# Patient Record
Sex: FEMALE | Race: WHITE | Employment: FULL TIME | URBAN - METROPOLITAN AREA
[De-identification: names, ages, dates, MRNs, and addresses within clinical notes are randomized per-mention and may not be internally consistent; named-entity substitution may affect disease eponyms.]

---

## 2017-12-19 ENCOUNTER — HOSPITAL ENCOUNTER (EMERGENCY)
Age: 62
Discharge: HOME | End: 2017-12-19
Payer: COMMERCIAL

## 2017-12-19 ENCOUNTER — APPOINTMENT (OUTPATIENT)
Dept: GENERAL RADIOLOGY | Age: 62
End: 2017-12-19
Payer: COMMERCIAL

## 2017-12-19 VITALS
OXYGEN SATURATION: 99 % | SYSTOLIC BLOOD PRESSURE: 136 MMHG | HEIGHT: 67 IN | TEMPERATURE: 97.8 F | DIASTOLIC BLOOD PRESSURE: 92 MMHG | RESPIRATION RATE: 19 BRPM | WEIGHT: 195 LBS | HEART RATE: 81 BPM | BODY MASS INDEX: 30.61 KG/M2

## 2017-12-19 DIAGNOSIS — J18.9 PNEUMONIA OF RIGHT LOWER LOBE DUE TO INFECTIOUS ORGANISM: Primary | ICD-10-CM

## 2017-12-19 LAB
ALBUMIN SERPL-MCNC: 4.9 G/DL (ref 3.9–4.9)
ALP BLD-CCNC: 74 U/L (ref 40–130)
ALT SERPL-CCNC: 33 U/L (ref 0–33)
ANION GAP SERPL CALCULATED.3IONS-SCNC: 19 MEQ/L (ref 7–13)
AST SERPL-CCNC: 24 U/L (ref 0–35)
BASOPHILS ABSOLUTE: 0.1 K/UL (ref 0–0.2)
BASOPHILS RELATIVE PERCENT: 1.2 %
BILIRUB SERPL-MCNC: 0.5 MG/DL (ref 0–1.2)
BUN BLDV-MCNC: 15 MG/DL (ref 8–23)
CALCIUM SERPL-MCNC: 10.1 MG/DL (ref 8.6–10.2)
CHLORIDE BLD-SCNC: 101 MEQ/L (ref 98–107)
CO2: 22 MEQ/L (ref 22–29)
CREAT SERPL-MCNC: 0.7 MG/DL (ref 0.5–0.9)
EKG ATRIAL RATE: 75 BPM
EKG P AXIS: 65 DEGREES
EKG P-R INTERVAL: 190 MS
EKG Q-T INTERVAL: 450 MS
EKG QRS DURATION: 90 MS
EKG QTC CALCULATION (BAZETT): 502 MS
EKG R AXIS: 74 DEGREES
EKG T AXIS: 85 DEGREES
EKG VENTRICULAR RATE: 75 BPM
EOSINOPHILS ABSOLUTE: 0.1 K/UL (ref 0–0.7)
EOSINOPHILS RELATIVE PERCENT: 1.4 %
GFR AFRICAN AMERICAN: >60
GFR NON-AFRICAN AMERICAN: >60
GLOBULIN: 3 G/DL (ref 2.3–3.5)
GLUCOSE BLD-MCNC: 96 MG/DL (ref 74–109)
HCT VFR BLD CALC: 41 % (ref 37–47)
HEMOGLOBIN: 14 G/DL (ref 12–16)
LYMPHOCYTES ABSOLUTE: 3 K/UL (ref 1–4.8)
LYMPHOCYTES RELATIVE PERCENT: 42.1 %
MCH RBC QN AUTO: 31.5 PG (ref 27–31.3)
MCHC RBC AUTO-ENTMCNC: 34 % (ref 33–37)
MCV RBC AUTO: 92.5 FL (ref 82–100)
MONOCYTES ABSOLUTE: 1.2 K/UL (ref 0.2–0.8)
MONOCYTES RELATIVE PERCENT: 16.5 %
NEUTROPHILS ABSOLUTE: 2.7 K/UL (ref 1.4–6.5)
NEUTROPHILS RELATIVE PERCENT: 38.8 %
PDW BLD-RTO: 13 % (ref 11.5–14.5)
PLATELET # BLD: 226 K/UL (ref 130–400)
POTASSIUM SERPL-SCNC: 4.6 MEQ/L (ref 3.5–5.1)
PRO-BNP: 69 PG/ML
RBC # BLD: 4.44 M/UL (ref 4.2–5.4)
SODIUM BLD-SCNC: 142 MEQ/L (ref 132–144)
TOTAL PROTEIN: 7.9 G/DL (ref 6.4–8.1)
TROPONIN: <0.01 NG/ML (ref 0–0.01)
WBC # BLD: 7 K/UL (ref 4.8–10.8)

## 2017-12-19 PROCEDURE — 96374 THER/PROPH/DIAG INJ IV PUSH: CPT

## 2017-12-19 PROCEDURE — 2580000003 HC RX 258: Performed by: PHYSICIAN ASSISTANT

## 2017-12-19 PROCEDURE — 80053 COMPREHEN METABOLIC PANEL: CPT

## 2017-12-19 PROCEDURE — 99285 EMERGENCY DEPT VISIT HI MDM: CPT

## 2017-12-19 PROCEDURE — 94640 AIRWAY INHALATION TREATMENT: CPT

## 2017-12-19 PROCEDURE — 6360000002 HC RX W HCPCS: Performed by: PHYSICIAN ASSISTANT

## 2017-12-19 PROCEDURE — 85025 COMPLETE CBC W/AUTO DIFF WBC: CPT

## 2017-12-19 PROCEDURE — 6370000000 HC RX 637 (ALT 250 FOR IP): Performed by: PHYSICIAN ASSISTANT

## 2017-12-19 PROCEDURE — 84484 ASSAY OF TROPONIN QUANT: CPT

## 2017-12-19 PROCEDURE — 71020 XR CHEST STANDARD TWO VW: CPT

## 2017-12-19 PROCEDURE — 83880 ASSAY OF NATRIURETIC PEPTIDE: CPT

## 2017-12-19 PROCEDURE — 36415 COLL VENOUS BLD VENIPUNCTURE: CPT

## 2017-12-19 PROCEDURE — 93005 ELECTROCARDIOGRAM TRACING: CPT

## 2017-12-19 RX ORDER — IPRATROPIUM BROMIDE AND ALBUTEROL SULFATE 2.5; .5 MG/3ML; MG/3ML
1 SOLUTION RESPIRATORY (INHALATION) ONCE
Status: COMPLETED | OUTPATIENT
Start: 2017-12-19 | End: 2017-12-19

## 2017-12-19 RX ORDER — METHYLPREDNISOLONE SODIUM SUCCINATE 125 MG/2ML
125 INJECTION, POWDER, LYOPHILIZED, FOR SOLUTION INTRAMUSCULAR; INTRAVENOUS ONCE
Status: COMPLETED | OUTPATIENT
Start: 2017-12-19 | End: 2017-12-19

## 2017-12-19 RX ORDER — BENZONATATE 100 MG/1
100 CAPSULE ORAL 3 TIMES DAILY PRN
Qty: 15 CAPSULE | Refills: 0 | Status: SHIPPED | OUTPATIENT
Start: 2017-12-19 | End: 2017-12-26

## 2017-12-19 RX ORDER — GUAIFENESIN, PSEUDOEPHEDRINE HYDROCHLORIDE 600; 60 MG/1; MG/1
1 TABLET, EXTENDED RELEASE ORAL EVERY 12 HOURS
Qty: 14 TABLET | Refills: 1 | Status: SHIPPED | OUTPATIENT
Start: 2017-12-19 | End: 2017-12-26

## 2017-12-19 RX ORDER — 0.9 % SODIUM CHLORIDE 0.9 %
500 INTRAVENOUS SOLUTION INTRAVENOUS ONCE
Status: COMPLETED | OUTPATIENT
Start: 2017-12-19 | End: 2017-12-19

## 2017-12-19 RX ORDER — ALBUTEROL SULFATE 90 UG/1
AEROSOL, METERED RESPIRATORY (INHALATION)
Qty: 1 INHALER | Refills: 0 | Status: SHIPPED | OUTPATIENT
Start: 2017-12-19

## 2017-12-19 RX ORDER — AZITHROMYCIN 250 MG/1
TABLET, FILM COATED ORAL
Qty: 1 PACKET | Refills: 0 | Status: SHIPPED | OUTPATIENT
Start: 2017-12-19 | End: 2017-12-29

## 2017-12-19 RX ADMIN — METHYLPREDNISOLONE SODIUM SUCCINATE 125 MG: 125 INJECTION, POWDER, FOR SOLUTION INTRAMUSCULAR; INTRAVENOUS at 18:50

## 2017-12-19 RX ADMIN — IPRATROPIUM BROMIDE AND ALBUTEROL SULFATE 1 AMPULE: .5; 3 SOLUTION RESPIRATORY (INHALATION) at 18:47

## 2017-12-19 RX ADMIN — SODIUM CHLORIDE 500 ML: 9 INJECTION, SOLUTION INTRAVENOUS at 18:50

## 2017-12-19 ASSESSMENT — ENCOUNTER SYMPTOMS
DIARRHEA: 0
NAUSEA: 0
COUGH: 1
ABDOMINAL PAIN: 0
SHORTNESS OF BREATH: 1
VOMITING: 0
SINUS PRESSURE: 1

## 2017-12-19 ASSESSMENT — PULMONARY FUNCTION TESTS: PEFR_L/MIN: 250

## 2017-12-19 NOTE — ED NOTES
Bed: 15  Expected date: 12/19/17  Expected time: 5:50 PM  Means of arrival:   Comments:     Mack Prasad RN  12/19/17 [id number]

## 2017-12-20 NOTE — ED PROVIDER NOTES
[address] [facility] ED  eMERGENCY dEPARTMENT eNCOUnter      Pt Name: Jairo Baer  MRN: [id number]  Terrygfhemant [birth year]  Date of evaluation: 12/19/2017  Provider: Micheal Campos PA-C      HISTORY OF PRESENT ILLNESS    Jairo Baer is a 63 y.o. female who presents to the Emergency Department with Cough, congestion, shortness of breath and lightheadedness. Patient states that her cough is nonproductive. She feels like something needs to come out. She denies fevers. She does report chills. She is not an asthmatic for a smoker. She does not have a history of COPD. She denies chest pain. She is now oxygen at home. Patient states that the cough and congestion has been present for 3 days. Shortness of breath started this morning. Patient states she is only lightheaded if she gets up very fast or if she is having a coughing spell. REVIEW OF SYSTEMS       Review of Systems   Constitutional: Negative for chills, diaphoresis, fatigue and fever. HENT: Positive for congestion and sinus pressure. Respiratory: Positive for cough and shortness of breath. Cardiovascular: Negative for chest pain and palpitations. Gastrointestinal: Negative for abdominal pain, diarrhea, nausea and vomiting. Genitourinary: Negative for dysuria and flank pain. Skin: Negative for rash. Neurological: Positive for light-headedness. Negative for dizziness and headaches. PAST MEDICAL HISTORY     Past Medical History:   Diagnosis Date    GERD (gastroesophageal reflux disease)          SURGICAL HISTORY       Past Surgical History:   Procedure Laterality Date    PANCREATECTOMY  2005         CURRENT MEDICATIONS       Previous Medications    No medications on file       ALLERGIES     Review of patient's allergies indicates no known allergies. FAMILY HISTORY     History reviewed. No pertinent family history.        SOCIAL HISTORY       Social History     Social History    Marital status:      Spouse name: N/A    Number of children: N/A    Years of education: N/A     Social History Main Topics    Smoking status: Former Smoker     Quit date: 1997    Smokeless tobacco: Never Used    Alcohol use Yes      Comment: social    Drug use: No    Sexual activity: Not Asked     Other Topics Concern    None     Social History Narrative    None       SCREENINGS             PHYSICAL EXAM    (up to 7 for level 4, 8 or more for level 5)     ED Triage Vitals [12/19/17 1755]   BP Temp Temp Source Pulse Resp SpO2 Height Weight   (!) 176/86 97.9 °F (36.6 °C) Oral 76 20 100 % 5' 7\" (1.702 m) 195 lb (88.5 kg)       Physical Exam   Constitutional: She is oriented to person, place, and time. She appears well-developed and well-nourished. She is active. No distress. HENT:   Head: Normocephalic and atraumatic. Right Ear: Hearing, tympanic membrane, external ear and ear canal normal.   Left Ear: Hearing, tympanic membrane, external ear and ear canal normal.   Nose: Mucosal edema and rhinorrhea present. Mouth/Throat: Mucous membranes are normal.   Neck: Normal range of motion. Neck supple. Cardiovascular: Normal rate, regular rhythm, normal heart sounds, intact distal pulses and normal pulses. Pulmonary/Chest: Effort normal and breath sounds normal.   Abdominal: Soft. Normal appearance and bowel sounds are normal. There is no tenderness. Neurological: She is alert and oriented to person, place, and time. She has normal strength. Skin: Skin is warm, dry and intact. No rash noted. She is not diaphoretic. Nursing note and vitals reviewed. All other labs were within normal range or not returned as of this dictation.     EMERGENCY DEPARTMENT COURSE and DIFFERENTIAL DIAGNOSIS/MDM:   Vitals:    Vitals:    12/19/17 1755 12/19/17 1851 12/19/17 1915   BP: (!) 176/86  (!) 151/84   Pulse: 76 78 76   Resp: 20 20 18   Temp: 97.9 °F (36.6 °C)     TempSrc: Oral     SpO2: 100% 99% 100%   Weight: 195 lb (88.5 kg)     Height: 5' 7\"

## 2022-12-28 ENCOUNTER — OFFICE VISIT (OUTPATIENT)
Dept: GASTROENTEROLOGY | Age: 67
End: 2022-12-28
Payer: MEDICARE

## 2022-12-28 VITALS — BODY MASS INDEX: 29.03 KG/M2 | OXYGEN SATURATION: 99 % | HEART RATE: 98 BPM | WEIGHT: 185 LBS | HEIGHT: 67 IN

## 2022-12-28 DIAGNOSIS — R10.9 ABDOMINAL PAIN, UNSPECIFIED ABDOMINAL LOCATION: Primary | ICD-10-CM

## 2022-12-28 PROCEDURE — 99204 OFFICE O/P NEW MOD 45 MIN: CPT | Performed by: INTERNAL MEDICINE

## 2022-12-28 PROCEDURE — 1123F ACP DISCUSS/DSCN MKR DOCD: CPT | Performed by: INTERNAL MEDICINE

## 2022-12-28 RX ORDER — SUCRALFATE 1 G/1
TABLET ORAL
COMMUNITY
Start: 2022-12-14

## 2022-12-28 RX ORDER — VALSARTAN AND HYDROCHLOROTHIAZIDE 160; 12.5 MG/1; MG/1
TABLET, FILM COATED ORAL
COMMUNITY
Start: 2022-11-29

## 2022-12-28 RX ORDER — AZELASTINE 1 MG/ML
2 SPRAY, METERED NASAL 2 TIMES DAILY PRN
COMMUNITY
Start: 2022-02-10

## 2022-12-28 ASSESSMENT — ENCOUNTER SYMPTOMS
WHEEZING: 0
NAUSEA: 0
RECTAL PAIN: 0
VOMITING: 0
BLOOD IN STOOL: 0
TROUBLE SWALLOWING: 1
DIARRHEA: 0
ABDOMINAL PAIN: 1
SHORTNESS OF BREATH: 0
EYE PAIN: 0
VOICE CHANGE: 0
ABDOMINAL DISTENTION: 0
CHEST TIGHTNESS: 0
COLOR CHANGE: 0
EYE REDNESS: 0
CONSTIPATION: 0
PHOTOPHOBIA: 0

## 2022-12-28 NOTE — PROGRESS NOTES
Subjective:      Patient ID: Maddi Barone is a 74 y.o. female who presents today for:  Chief Complaint   Patient presents with    New Patient     Patient is here today because of GERD. Patient states that her primary care doctor recently doubled her PPI and it is helping. Patient also reports having a choking sensation a few years ago and had an EGD and Barium Swallow at that time. HPI  This is a very pleasant 13year-old who came in today for the evaluation management of epigastric pain and heartburn/GERD. Patient longstanding history of GERD diagnosed at least 15 years ago. Patient has been to multiple medication and had previous EGD at [facility]. She also reports intermittent dysphagia. Over the last few weeks patient has noticed worsening epigastric pain she did follow with the primary team and had PPI increased to twice daily. Additionally, Carafate was initiated. Patient mentioned that her symptoms could be triggered by stress however she also reported that symptom improved after initiation of PPI and Carafate. 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 Patient reports that she was noted to have worsening dyslipidemia and lipid profile and she been following weight loss program.  Denies change of bowel movement in terms of diarrhea constipation. No blood in the stool. Given the GERD and epigastric pain, patient referred to us for the evaluation management.   Patient had Wava Halim in 2016 and was commended 10 years from  Past Medical History:   Diagnosis Date    GERD (gastroesophageal reflux disease)      Past Surgical History:   Procedure Laterality Date    PANCREATECTOMY  2005     Social History     Socioeconomic History    Marital status:      Spouse name: Not on file    Number of children: Not on file    Years of education: Not on file    Highest education level: Not on file   Occupational History    Not on file Tobacco Use    Smoking status: Former     Types: Cigarettes     Quit date:      Years since quittin.0    Smokeless tobacco: Never   Substance and Sexual Activity    Alcohol use: Yes     Comment: social    Drug use: No    Sexual activity: Not on file   Other Topics Concern    Not on file   Social History Narrative    Not on file     Social Determinants of Health     Financial Resource Strain: Not on file   Food Insecurity: Not on file   Transportation Needs: Not on file   Physical Activity: Not on file   Stress: Not on file   Social Connections: Not on file   Intimate Partner Violence: Not on file   Housing Stability: Not on file     No family history on file. No Known Allergies      Review of Systems   Constitutional:  Negative for appetite change, chills, fatigue, fever and unexpected weight change. HENT:  Positive for trouble swallowing. Negative for nosebleeds, tinnitus and voice change. Eyes:  Negative for photophobia, pain and redness. Respiratory:  Negative for chest tightness, shortness of breath and wheezing. Cardiovascular:  Negative for chest pain, palpitations and leg swelling. Gastrointestinal:  Positive for abdominal pain. Negative for abdominal distention, blood in stool, constipation, diarrhea, nausea, rectal pain and vomiting. Endocrine: Negative for polydipsia, polyphagia and polyuria. Genitourinary:  Negative for difficulty urinating and hematuria. Skin:  Negative for color change, pallor and rash. Neurological:  Negative for dizziness, speech difficulty and headaches. Psychiatric/Behavioral:  Negative for confusion and suicidal ideas. Objective:   Pulse 98   Ht 5' 7\" (1.702 m)   Wt 185 lb (83.9 kg)   SpO2 99%   BMI 28.98 kg/m²     Physical Exam  Constitutional:       General: She is not in acute distress. Appearance: She is well-developed. HENT:      Head: Normocephalic and atraumatic.    Eyes:      Conjunctiva/sclera: Conjunctivae normal. Pupils: Pupils are equal, round, and reactive to light. Cardiovascular:      Rate and Rhythm: Normal rate and regular rhythm. Heart sounds: Normal heart sounds. Pulmonary:      Effort: Pulmonary effort is normal. No respiratory distress. Breath sounds: Normal breath sounds. No wheezing or rales. Abdominal:      General: Bowel sounds are normal. There is no distension. Palpations: Abdomen is soft. Abdomen is not rigid. There is no hepatomegaly, splenomegaly or mass. Tenderness: There is no abdominal tenderness. There is no guarding or rebound. Musculoskeletal:         General: No tenderness or deformity. Normal range of motion. Cervical back: Neck supple. Skin:     Coloration: Skin is not pale. Findings: No erythema or rash. Neurological:      Mental Status: She is alert and oriented to person, place, and time. Laboratory, Pathology, Radiology reviewed in detail with relevantimportant investigations summarized below:  Lab Results   Component Value Date/Time    WBC 7.0 12/19/2017 06:45 PM    HGB 14.0 12/19/2017 06:45 PM    HCT 41.0 12/19/2017 06:45 PM    MCV 92.5 12/19/2017 06:45 PM     12/19/2017 06:45 PM    .  Lab Results   Component Value Date/Time    ALT 33 12/19/2017 06:45 PM    AST 24 12/19/2017 06:45 PM    ALKPHOS 74 12/19/2017 06:45 PM    BILITOT 0.5 12/19/2017 06:45 PM       No results found. No results found for: IRON, TIBC, FERRITIN  No results found for: INR  No components found for: ACUTEHEPATITISSCREEN  No components found for: CELIACPANEL  No components found for: STOOLCULTURE, C.DIFF, STOOLOVAPARASITE, STOOLLEUCOCYTE        Assessment:   Assessment and plan:    1.  Heartburn / GERD/ Epigastric pain  Also endorses intermittent dysphagia   lifestyle modification recommended and discussed with the patient   --Avoid spicy and acidic based foods   --Limit coffee, tea, alcohol use   --Limit the amount of food during meal time   --Avoid bedtime snacks and eat meals 3 to 4 hrs before lying down   --Elevate the head of the bed    --Keep healthy weight  EGD requested to assess for heartburn/GERD related complication. Assess the presence of hiatal hernia. The upper endoscopy procedure, complications, risk and benefit were reviewed. Patient would like to proceed accordingly. Consideration for esophageal biopsies plus minus dilation based on findings   Given the abdominal pain, proceed with cross-sectional study CT abdomen for the rash management  Continue current PPI  2-History of distal pancreatectomy:  She reports previous pancreatectomy as was diagnosed with mucinous neoplasm in 2004  3-Associated medical conditions: Reported history of colonoscopy in 2016 at the time Lorna Gonzalez was reportedly negative and was told to have 10 years follow-up, anxiety. ... Return in about 6 weeks (around 2/8/2023) for Post procedure results discussion, further management.       Jaswinder Lizarraga MD

## 2022-12-30 ENCOUNTER — HOSPITAL ENCOUNTER (OUTPATIENT)
Age: 67
Setting detail: OUTPATIENT SURGERY
Discharge: HOME | End: 2022-12-30
Attending: INTERNAL MEDICINE | Admitting: INTERNAL MEDICINE
Payer: MEDICARE

## 2022-12-30 ENCOUNTER — ANESTHESIA EVENT (OUTPATIENT)
Dept: ENDOSCOPY | Age: 67
End: 2022-12-30
Payer: MEDICARE

## 2022-12-30 ENCOUNTER — ANESTHESIA (OUTPATIENT)
Dept: ENDOSCOPY | Age: 67
End: 2022-12-30
Payer: MEDICARE

## 2022-12-30 VITALS
HEIGHT: 67 IN | OXYGEN SATURATION: 95 % | SYSTOLIC BLOOD PRESSURE: 129 MMHG | WEIGHT: 185 LBS | DIASTOLIC BLOOD PRESSURE: 66 MMHG | RESPIRATION RATE: 18 BRPM | BODY MASS INDEX: 29.03 KG/M2 | HEART RATE: 79 BPM | TEMPERATURE: 98.2 F

## 2022-12-30 DIAGNOSIS — R10.9 ABDOMINAL PAIN, UNSPECIFIED ABDOMINAL LOCATION: ICD-10-CM

## 2022-12-30 PROCEDURE — 2580000003 HC RX 258: Performed by: INTERNAL MEDICINE

## 2022-12-30 PROCEDURE — 7100000011 HC PHASE II RECOVERY - ADDTL 15 MIN: Performed by: INTERNAL MEDICINE

## 2022-12-30 PROCEDURE — 7100000010 HC PHASE II RECOVERY - FIRST 15 MIN: Performed by: INTERNAL MEDICINE

## 2022-12-30 PROCEDURE — 6370000000 HC RX 637 (ALT 250 FOR IP): Performed by: INTERNAL MEDICINE

## 2022-12-30 PROCEDURE — 88305 TISSUE EXAM BY PATHOLOGIST: CPT

## 2022-12-30 PROCEDURE — 3609017100 HC EGD: Performed by: INTERNAL MEDICINE

## 2022-12-30 PROCEDURE — 2580000003 HC RX 258: Performed by: NURSE ANESTHETIST, CERTIFIED REGISTERED

## 2022-12-30 PROCEDURE — 6360000002 HC RX W HCPCS: Performed by: NURSE ANESTHETIST, CERTIFIED REGISTERED

## 2022-12-30 PROCEDURE — 3700000000 HC ANESTHESIA ATTENDED CARE: Performed by: INTERNAL MEDICINE

## 2022-12-30 PROCEDURE — 88342 IMHCHEM/IMCYTCHM 1ST ANTB: CPT

## 2022-12-30 PROCEDURE — 2580000003 HC RX 258

## 2022-12-30 PROCEDURE — 2709999900 HC NON-CHARGEABLE SUPPLY: Performed by: INTERNAL MEDICINE

## 2022-12-30 PROCEDURE — 2500000003 HC RX 250 WO HCPCS: Performed by: NURSE ANESTHETIST, CERTIFIED REGISTERED

## 2022-12-30 PROCEDURE — 43239 EGD BIOPSY SINGLE/MULTIPLE: CPT | Performed by: INTERNAL MEDICINE

## 2022-12-30 RX ORDER — SODIUM CHLORIDE, SODIUM LACTATE, POTASSIUM CHLORIDE, CALCIUM CHLORIDE 600; 310; 30; 20 MG/100ML; MG/100ML; MG/100ML; MG/100ML
INJECTION, SOLUTION INTRAVENOUS CONTINUOUS PRN
Status: DISCONTINUED | OUTPATIENT
Start: 2022-12-30 | End: 2022-12-30 | Stop reason: SDUPTHER

## 2022-12-30 RX ORDER — SODIUM CHLORIDE 9 MG/ML
INJECTION, SOLUTION INTRAVENOUS
Status: COMPLETED
Start: 2022-12-30 | End: 2022-12-30

## 2022-12-30 RX ORDER — MAGNESIUM HYDROXIDE 1200 MG/15ML
LIQUID ORAL PRN
Status: DISCONTINUED | OUTPATIENT
Start: 2022-12-30 | End: 2022-12-30 | Stop reason: ALTCHOICE

## 2022-12-30 RX ORDER — SIMETHICONE 20 MG/.3ML
EMULSION ORAL PRN
Status: DISCONTINUED | OUTPATIENT
Start: 2022-12-30 | End: 2022-12-30 | Stop reason: ALTCHOICE

## 2022-12-30 RX ORDER — LIDOCAINE HYDROCHLORIDE 20 MG/ML
INJECTION, SOLUTION INFILTRATION; PERINEURAL PRN
Status: DISCONTINUED | OUTPATIENT
Start: 2022-12-30 | End: 2022-12-30 | Stop reason: SDUPTHER

## 2022-12-30 RX ORDER — PROPOFOL 10 MG/ML
INJECTION, EMULSION INTRAVENOUS PRN
Status: DISCONTINUED | OUTPATIENT
Start: 2022-12-30 | End: 2022-12-30 | Stop reason: SDUPTHER

## 2022-12-30 RX ORDER — SODIUM CHLORIDE 9 MG/ML
INJECTION, SOLUTION INTRAVENOUS CONTINUOUS
Status: DISCONTINUED | OUTPATIENT
Start: 2022-12-30 | End: 2022-12-30 | Stop reason: HOSPADM

## 2022-12-30 RX ADMIN — LIDOCAINE HYDROCHLORIDE 80 MG: 20 INJECTION, SOLUTION INFILTRATION; PERINEURAL at 09:49

## 2022-12-30 RX ADMIN — SODIUM CHLORIDE: 9 INJECTION, SOLUTION INTRAVENOUS at 08:57

## 2022-12-30 RX ADMIN — SODIUM CHLORIDE, POTASSIUM CHLORIDE, SODIUM LACTATE AND CALCIUM CHLORIDE: 600; 310; 30; 20 INJECTION, SOLUTION INTRAVENOUS at 09:46

## 2022-12-30 RX ADMIN — PROPOFOL 100 MG: 10 INJECTION, EMULSION INTRAVENOUS at 09:54

## 2022-12-30 RX ADMIN — PROPOFOL 100 MG: 10 INJECTION, EMULSION INTRAVENOUS at 09:49

## 2022-12-30 ASSESSMENT — PAIN - FUNCTIONAL ASSESSMENT: PAIN_FUNCTIONAL_ASSESSMENT: 0-10

## 2022-12-30 ASSESSMENT — PAIN SCALES - GENERAL
PAINLEVEL_OUTOF10: 0

## 2022-12-30 NOTE — H&P
Patient Name: Adryan Larson  : [birth year]  MRN: [id number]  DATE: 22      ENDOSCOPY  History and Physical    Procedure:    [] Diagnostic Colonoscopy       [] Screening Colonoscopy  [x] EGD      [] ERCP      [] EUS       [] Other    [x] Previous office notes/History and Physical reviewed from the patients chart. Please see EMR for further details of HPI. I have examined the patient's status immediately prior to the procedure and:      Indications/HPI:    [x]Abdominal Pain   []Cancer- GI/Lung  []Fhx of colon CA/polyps  []History of Polyps   []Chungs   []Melena  []Abnormal Imaging   [x]Dysphagia    []Persistent Pneumonia  []Anemia   []Food Impaction  []History of Polyps  []GI Bleed   []Pulmonary nodule/Mass  []Change in bowel habits  []Heartburn/Reflux  []Rectal Bleed (BRBPR)  []Chest Pain - Non Cardiac  []Heme (+) Stool  []Ulcers  []Constipation   []Hemoptysis   []Varices  []Diarrhea   []Hypoxemia  []Nausea/Vomiting   []Screening   []Crohns/Colitis  []Other:    Anesthesia:   [x] MAC [] Moderate Sedation   [] General   [] None     ROS: 12 pt Review of Symptoms was negative unless mentioned above    Medications:   Prior to Admission medications    Medication Sig Start Date End Date Taking?  Authorizing Provider   Probiotic Product (PROBIOTIC-10 PO) Take by mouth 1 tablet daily   Yes Historical Provider, MD   Multiple Vitamins-Minerals (CENTRUM SILVER 50+WOMEN PO) Take by mouth   Yes Historical Provider, MD   esomeprazole (NEXIUM) 20 MG delayed release capsule Take 20 mg by mouth 2 times daily (before meals) 12/15/22 6/13/23  Historical Provider, MD   sucralfate (CARAFATE) 1 GM tablet TAKE 1 TABLET BY MOUTH TWICE DAILY  Patient not taking: Reported on 2022   Historical Provider, MD   vitamin D 25 MCG (1000 UT) CAPS Take 1,000 Units by mouth 2 times daily 21   Historical Provider, MD   azelastine (ASTELIN) 0.1 % nasal spray 2 sprays by NOT APPLICABLE route 2 times daily as needed 2/10/22 Historical Provider, MD   valsartan-hydroCHLOROthiazide (DIOVAN-HCT) 160-12.5 MG per tablet Take 1 tablet by mouth once daily. 22   Historical Provider, MD   albuterol sulfate  (90 Base) MCG/ACT inhaler Use 2 puffs 4 times daily for 7 days then as needed for wheezing. Dispense with Spacer and instruct in use. At patient's preference may use 60 dose MDI. May Sub Pro-Air or Proventil as needed per insurance. Patient not taking: Reported on 2022   Baltazar Martinez PA-C       Allergies: No Known Allergies     History of allergic reaction to anesthesia:  No    Past Medical History:  Past Medical History:   Diagnosis Date    GERD (gastroesophageal reflux disease)     Hypertension        Past Surgical History:  Past Surgical History:   Procedure Laterality Date    COLONOSCOPY      [facility]    PANCREATECTOMY         Social History:  Social History     Tobacco Use    Smoking status: Former     Types: Cigarettes     Quit date:      Years since quittin.0    Smokeless tobacco: Never   Vaping Use    Vaping Use: Never used   Substance Use Topics    Alcohol use: Yes     Comment: social    Drug use: No       Vital Signs:   Vitals:    22 0846   BP: (!) 156/82   Pulse: 87   Resp: 18   Temp: 98.2 °F (36.8 °C)   SpO2: 99%        Physical Exam:  Cardiac:  [x]WNL  []Comments:  Pulmonary:  [x]WNL   []Comments:   Neuro/Mental Status:  [x]WNL  []Comments:  Abdominal:  [x]WNL    []Comments:  Other:   []WNL  []Comments:    Informed Consent:  The risks and benefits of the procedure have been discussed with either the patient or if they cannot consent, their representative. Assessment:  Patient examined and appropriate for planned sedation and procedure. Plan:  Proceed with planned sedation and procedure as above.     Oral Page MD  9:10 AM

## 2022-12-30 NOTE — ANESTHESIA PRE PROCEDURE
Department of Anesthesiology  Preprocedure Note       Name:  Huma Almendarez   Age:  74 y.o.  :  1955                                          MRN:  [id number]         Date:  2022      Surgeon: Simon Muller):  Kathy Cobos MD    Procedure: Procedure(s):  EGD ESOPHAGOGASTRODUODENOSCOPY    Medications prior to admission:   Prior to Admission medications    Medication Sig Start Date End Date Taking? Authorizing Provider   esomeprazole (NEXIUM) 20 MG delayed release capsule Take 20 mg by mouth 2 times daily (before meals) 12/15/22 6/13/23  Historical Provider, MD   sucralfate (CARAFATE) 1 GM tablet TAKE 1 TABLET BY MOUTH TWICE DAILY 22   Historical Provider, MD   vitamin D 25 MCG (1000 UT) CAPS Take 1,000 Units by mouth 2 times daily 21   Historical Provider, MD   azelastine (ASTELIN) 0.1 % nasal spray 2 sprays by NOT APPLICABLE route 2 times daily as needed 2/10/22   Historical Provider, MD   valsartan-hydroCHLOROthiazide (DIOVAN-HCT) 160-12.5 MG per tablet Take 1 tablet by mouth once daily. 22   Historical Provider, MD   albuterol sulfate  (90 Base) MCG/ACT inhaler Use 2 puffs 4 times daily for 7 days then as needed for wheezing. Dispense with Spacer and instruct in use. At patient's preference may use 60 dose MDI. May Sub Pro-Air or Proventil as needed per insurance. 17   Jade Alvarado PA-C       Current medications:    No current facility-administered medications for this encounter. Allergies:  No Known Allergies    Problem List:  There is no problem list on file for this patient.       Past Medical History:        Diagnosis Date    GERD (gastroesophageal reflux disease)        Past Surgical History:        Procedure Laterality Date    PANCREATECTOMY         Social History:    Social History     Tobacco Use    Smoking status: Former     Types: Cigarettes     Quit date:      Years since quittin.0    Smokeless tobacco: Never   Substance Use Topics    Alcohol use: Yes     Comment: social                                Counseling given: Not Answered      Vital Signs (Current): There were no vitals filed for this visit. BP Readings from Last 3 Encounters:   12/19/17 (!) 136/92       NPO Status:                                                                                 BMI:   Wt Readings from Last 3 Encounters:   12/28/22 185 lb (83.9 kg)   12/19/17 195 lb (88.5 kg)     There is no height or weight on file to calculate BMI.    CBC:   Lab Results   Component Value Date/Time    WBC 7.0 12/19/2017 06:45 PM    RBC 4.44 12/19/2017 06:45 PM    HGB 14.0 12/19/2017 06:45 PM    HCT 41.0 12/19/2017 06:45 PM    MCV 92.5 12/19/2017 06:45 PM    RDW 13.0 12/19/2017 06:45 PM     12/19/2017 06:45 PM       CMP:   Lab Results   Component Value Date/Time     12/19/2017 06:45 PM    K 4.6 12/19/2017 06:45 PM     12/19/2017 06:45 PM    CO2 22 12/19/2017 06:45 PM    BUN 15 12/19/2017 06:45 PM    CREATININE 0.70 12/19/2017 06:45 PM    GFRAA >60.0 12/19/2017 06:45 PM    LABGLOM >60.0 12/19/2017 06:45 PM    GLUCOSE 96 12/19/2017 06:45 PM    PROT 7.9 12/19/2017 06:45 PM    CALCIUM 10.1 12/19/2017 06:45 PM    BILITOT 0.5 12/19/2017 06:45 PM    ALKPHOS 74 12/19/2017 06:45 PM    AST 24 12/19/2017 06:45 PM    ALT 33 12/19/2017 06:45 PM       POC Tests: No results for input(s): POCGLU, POCNA, POCK, POCCL, POCBUN, POCHEMO, POCHCT in the last 72 hours.     Coags: No results found for: PROTIME, INR, APTT    HCG (If Applicable): No results found for: PREGTESTUR, PREGSERUM, HCG, HCGQUANT     ABGs: No results found for: PHART, PO2ART, OGB3JYH, QKR6NJJ, BEART, E8GCMPGH     Type & Screen (If Applicable):  No results found for: LABABO, LABRH    Drug/Infectious Status (If Applicable):  No results found for: HIV, HEPCAB    COVID-19 Screening (If Applicable): No results found for: COVID19        Anesthesia Evaluation  Patient summary reviewed and Nursing notes reviewed  Airway: Mallampati: II  TM distance: >3 FB   Neck ROM: full  Mouth opening: > = 3 FB   Dental: normal exam         Pulmonary:                              Cardiovascular:                      Neuro/Psych:               GI/Hepatic/Renal:             Endo/Other:                     Abdominal:             Vascular: Other Findings:           Anesthesia Plan      MAC     ASA 3             Anesthetic plan and risks discussed with patient. Use of blood products discussed with patient whom consented to blood products.                      NETTE Roberts - CRNA   12/30/2022

## 2022-12-30 NOTE — ANESTHESIA POSTPROCEDURE EVALUATION
Department of Anesthesiology  Postprocedure Note    Patient: Matthew Chawla  MRN: [id number]  YOB: 1955  Date of evaluation: 12/30/2022      Procedure Summary     Date: 12/30/22 Room / Location: [address] [place] [address] [address] Monique Aguilarer    Anesthesia Start: 9844 Anesthesia Stop: 6170    Procedure: EGD ESOPHAGOGASTRODUODENOSCOPY Diagnosis:       Abdominal pain, unspecified abdominal location      (Abdominal pain, unspecified abdominal location [R10.9])    Surgeons: Silver Potter MD Responsible Provider: NETTE Ashley CRNA    Anesthesia Type: MAC ASA Status: 3          Anesthesia Type: No value filed.     Salomon Phase I: Salomon Score: 10    Salomon Phase II:        Anesthesia Post Evaluation    Patient location during evaluation: bedside  Patient participation: complete - patient participated  Level of consciousness: responsive to light touch  Pain score: 0  Airway patency: patent  Nausea & Vomiting: no nausea and no vomiting  Complications: no  Cardiovascular status: blood pressure returned to baseline and hemodynamically stable  Respiratory status: spontaneous ventilation, nonlabored ventilation, acceptable and nasal cannula  Hydration status: euvolemic

## 2023-01-03 ENCOUNTER — HOSPITAL ENCOUNTER (OUTPATIENT)
Dept: CT IMAGING | Age: 68
Discharge: HOME | End: 2023-01-05
Payer: MEDICARE

## 2023-01-03 DIAGNOSIS — R10.9 ABDOMINAL PAIN, UNSPECIFIED ABDOMINAL LOCATION: ICD-10-CM

## 2023-01-03 LAB
GFR SERPL CREATININE-BSD FRML MDRD: >60 ML/MIN/{1.73_M2}
PERFORMED ON: NORMAL
POC CREATININE: 0.8 MG/DL (ref 0.6–1.2)
POC SAMPLE TYPE: NORMAL

## 2023-01-03 PROCEDURE — 74177 CT ABD & PELVIS W/CONTRAST: CPT

## 2023-01-03 PROCEDURE — 6360000004 HC RX CONTRAST MEDICATION: Performed by: INTERNAL MEDICINE

## 2023-01-03 RX ADMIN — IOPAMIDOL 18 ML: 755 INJECTION, SOLUTION INTRAVENOUS at 09:34

## 2023-01-03 RX ADMIN — IOPAMIDOL 50 ML: 612 INJECTION, SOLUTION INTRAVENOUS at 09:38

## 2023-01-19 ENCOUNTER — OFFICE VISIT (OUTPATIENT)
Dept: GASTROENTEROLOGY | Age: 68
End: 2023-01-19
Payer: MEDICARE

## 2023-01-19 VITALS
OXYGEN SATURATION: 98 % | HEART RATE: 89 BPM | SYSTOLIC BLOOD PRESSURE: 128 MMHG | DIASTOLIC BLOOD PRESSURE: 74 MMHG | BODY MASS INDEX: 30.07 KG/M2 | WEIGHT: 192 LBS

## 2023-01-19 DIAGNOSIS — R10.9 ABDOMINAL PAIN, UNSPECIFIED ABDOMINAL LOCATION: Primary | ICD-10-CM

## 2023-01-19 PROCEDURE — G8400 PT W/DXA NO RESULTS DOC: HCPCS | Performed by: INTERNAL MEDICINE

## 2023-01-19 PROCEDURE — G8484 FLU IMMUNIZE NO ADMIN: HCPCS | Performed by: INTERNAL MEDICINE

## 2023-01-19 PROCEDURE — G8427 DOCREV CUR MEDS BY ELIG CLIN: HCPCS | Performed by: INTERNAL MEDICINE

## 2023-01-19 PROCEDURE — 3017F COLORECTAL CA SCREEN DOC REV: CPT | Performed by: INTERNAL MEDICINE

## 2023-01-19 PROCEDURE — G8417 CALC BMI ABV UP PARAM F/U: HCPCS | Performed by: INTERNAL MEDICINE

## 2023-01-19 PROCEDURE — 1090F PRES/ABSN URINE INCON ASSESS: CPT | Performed by: INTERNAL MEDICINE

## 2023-01-19 PROCEDURE — 99213 OFFICE O/P EST LOW 20 MIN: CPT | Performed by: INTERNAL MEDICINE

## 2023-01-19 PROCEDURE — 1036F TOBACCO NON-USER: CPT | Performed by: INTERNAL MEDICINE

## 2023-01-19 PROCEDURE — 1123F ACP DISCUSS/DSCN MKR DOCD: CPT | Performed by: INTERNAL MEDICINE

## 2023-01-19 NOTE — PROGRESS NOTES
Subjective:      Patient ID: Sakina Stewart is a 74 y.o. female who presents today for:  Chief Complaint   Patient presents with    Follow Up After Procedure       HPI  Patient came in today for follow-up visit. No abdominal pain reported. Patient is doing well with no particular complaint. Since last visit patient had CAT scan that was negative. Also had EGD with no acute findings. EGD was normal.  Of note patient was complaining of dysphagia. Biopsies were negative for eosinophilic esophagitis. Otherwise no particular complaint reported at this time. Patient came in today for routine follow-up  Interval note  This is a very pleasant 56-year-old who came in today for the evaluation management of epigastric pain and heartburn/GERD. Patient longstanding history of GERD diagnosed at least 15 years ago. Patient has been to multiple medication and had previous EGD at [facility]. She also reports intermittent dysphagia. Over the last few weeks patient has noticed worsening epigastric pain she did follow with the primary team and had PPI increased to twice daily. Additionally, Carafate was initiated. Patient mentioned that her symptoms could be triggered by stress however she also reported that symptom improved after initiation of PPI and Carafate. 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 Patient reports that she was noted to have worsening dyslipidemia and lipid profile and she been following weight loss program.  Denies change of bowel movement in terms of diarrhea constipation. No blood in the stool. Given the GERD and epigastric pain, patient referred to us for the evaluation management.   Patient had Dwaine Love in 2016 and was commended 10 years from  Past Medical History:   Diagnosis Date    GERD (gastroesophageal reflux disease)     Hypertension      Past Surgical History:   Procedure Laterality Date    COLONOSCOPY      [facility] PANCREATECTOMY  2005    UPPER GASTROINTESTINAL ENDOSCOPY N/A 2022    EGD ESOPHAGOGASTRODUODENOSCOPY performed by Reji Keith MD at [facility]     Social History     Socioeconomic History    Marital status:      Spouse name: Not on file    Number of children: Not on file    Years of education: Not on file    Highest education level: Not on file   Occupational History    Not on file   Tobacco Use    Smoking status: Former     Types: Cigarettes     Quit date:      Years since quittin.0    Smokeless tobacco: Never   Vaping Use    Vaping Use: Never used   Substance and Sexual Activity    Alcohol use: Yes     Comment: social    Drug use: No    Sexual activity: Not on file   Other Topics Concern    Not on file   Social History Narrative    Not on file     Social Determinants of Health     Financial Resource Strain: Not on file   Food Insecurity: Not on file   Transportation Needs: Not on file   Physical Activity: Not on file   Stress: Not on file   Social Connections: Not on file   Intimate Partner Violence: Not on file   Housing Stability: Not on file     Family History   Problem Relation Age of Onset    Colon Cancer Neg Hx      No Known Allergies      Review of Systems    Objective:   /74 (Site: Right Upper Arm, Position: Sitting, Cuff Size: Small Adult)   Pulse 89   Wt 192 lb (87.1 kg)   SpO2 98%   BMI 30.07 kg/m²     Physical Exam    Laboratory, Pathology, Radiology reviewed in detail with relevantimportant investigations summarized below:  Lab Results   Component Value Date/Time    WBC 7.0 2017 06:45 PM    HGB 14.0 2017 06:45 PM    HCT 41.0 2017 06:45 PM    MCV 92.5 2017 06:45 PM     2017 06:45 PM    .  Lab Results   Component Value Date/Time    ALT 33 2017 06:45 PM    AST 24 2017 06:45 PM    ALKPHOS 74 2017 06:45 PM    BILITOT 0.5 2017 06:45 PM       CT ABDOMEN PELVIS W IV CONTRAST Additional Contrast?  None    Result Date: 1/3/2023  EXAMINATION: CT OF THE ABDOMEN AND PELVIS WITH CONTRAST 1/3/2023 9:33 am TECHNIQUE: CT of the abdomen and pelvis was performed after PO contrast, without the administration of intravenous contrast. Multiplanar reformatted images are provided for review. Automated exposure control, iterative reconstruction, and/or weight based adjustment of the mA/kV was utilized to reduce the radiation dose to as low as reasonably achievable. COMPARISON: CT abdomen pelvis CT from December 24, 2005 HISTORY: ORDERING SYSTEM PROVIDED HISTORY: Abdominal pain, unspecified abdominal location TECHNOLOGIST PROVIDED HISTORY: Additional Contrast?->None 7237 STAT Creatinine as needed:->Yes What reading provider will be dictating this exam?->CRC FINDINGS: Lower Chest: There are no infiltrates or effusions. There is a 8 mm nodule in the left costophrenic recess similar to the prior studies. Organs:  No acute changes of the liver, spleen, pancreas, adrenal glands or kidneys. Status post cholecystectomy. There are numerous clips in the upper abdomen indicating prior surgery. GI/Bowel: There is no evidence of bowel obstruction. No evidence of abnormal bowel wall thickening or distension. There are loops of small bowel which are adherent to the left lateral abdominal wall with possible either a adhesions from prior surgery or incompletely hernia into the left abdominal wall. There is no evidence of bowel obstruction. , axial images 62-66 The appendix is visualized and is unremarkable. No evidence of acute appendicitis. Pelvis: The uterus and adnexa are without acute abnormality. Peritoneum/Retroperitoneum: No evidence of ascites or free air. Bones/Soft Tissues:  No acute abnormality of the visualized osseous structures. There are no acute intra-abdominal changes.   There are loops of small bowel which appear to and here or be in close proximity to the left lateral abdominal wall which may be secondary to agents from prior surgery versus a small hernia through a small defect in the abdominal wall. RECOMMENDATIONS: Depending on the patient's risk profile may consider low-dose chest CT screening     EGD    Result Date: 12/30/2022  Site: [address] [place] [address] Patient Name: Lakesha Schmidt Procedure Date: 12/30/2022 MRN: [id number] YOB: 1955 Gender: Female Attending MD: Katerin Perdomo Indications:        -  Dyspepsia        -  Dysphagia Medications:        -  Monitored Anesthesia Care Complications:        -  No immediate complications. Estimated blood loss: None. Estimated Blood Loss:        -  Estimated blood loss: none. Procedure:        - The Gastroscope was introduced through the mouth and advanced to the second           part of the duodenum.        -  The patient tolerated the procedure well. Findings:        -  The examined esophagus was normal.  Biopsies were taken with a cold forceps           for histology. -  Esophagogastric landmarks were identified: the gastroesophageal junction           was found at 38 cm, the lower esophageal sphincter was found at 38 cm and the           upper extent of the gastric folds was found at 38 cm from the incisors. -  The entire examined stomach was normal.  Biopsies were taken with a cold           forceps for histology. -  The examined duodenum was normal.        -  The cardia and gastric fundus were normal on retroflexion. Impression:        -  Normal esophagus. Biopsied.        -  Esophagogastric landmarks identified.        -  Normal stomach. Biopsied.        -  Normal examined duodenum. Recommendation:        -  Await pathology results. -  Return to GI clinic in 2 weeks. -  Continue present medications. -  The patient will be observed post-procedure, until all discharge criteria           are met. -  Patient has a contact number available for emergencies.   The signs and           symptoms of potential delayed complications were discussed with the patient. Return to normal activities tomorrow. Written discharge instructions were           provided to the patient. Procedure Code(s):        - [id number], Esophagogastroduodenoscopy, flexible, transoral; with biopsy, single           or multiple Diagnosis Code(s):        - R10.13, Epigastric pain        - R13.10, Dysphagia, unspecified       CPT(R) - 2022 copyright [employer]. All Rights Reserved. The CPT codes, CCI edits and ICD codes generated are intended as suggestions       and were generated based on input data. These codes are preliminary and upon        review may be revised to meet current compliance and payer requirements. The provider is responsible for the final determination of appropriate codes,       and modifiers. Signature Name: Karen Bender MD Signature Statement: This document has been electronically signed. Note Initiated On:12/30/2022 Signature Date:12/30/2022 10:01 AM    No results found for: IRON, TIBC, FERRITIN  No results found for: INR  No components found for: ACUTEHEPATITISSCREEN  No components found for: CELIACPANEL  No components found for: STOOLCULTURE, C.DIFF, STOOLOVAPARASITE, STOOLLEUCOCYTE        Assessment:      1. GERD  Recently reported dysphagia epigastric pain has resolved. No further  symptoms reported at this time.   Continue lifestyle modifications              --Avoid spicy and acidic based foods              --Limit coffee, tea, alcohol use              --Limit the amount of food during meal time              --Avoid bedtime snacks and eat meals 3 to 4 hrs before lying down              --Elevate the head of the bed               --Keep healthy weight  EGD was negative negative esophageal and gastric biopsies  2-History of distal pancreatectomy:  She reports previous pancreatectomy as was diagnosed with mucinous neoplasm in 2004  Recent CT scan negative  3-Associated medical conditions: Reported history of colonoscopy in 2016 and was reportedly negative and was told to have 10 years follow-up, anxiety. ... Return in about 6 months (around 7/19/2023) for further management.       Diaz Farrar MD

## 2023-09-04 DIAGNOSIS — R91.1 LUNG NODULE SEEN ON IMAGING STUDY: Primary | ICD-10-CM

## 2023-09-19 ENCOUNTER — PREP FOR PROCEDURE (OUTPATIENT)
Dept: GASTROENTEROLOGY | Age: 68
End: 2023-09-19

## 2023-09-19 ENCOUNTER — OFFICE VISIT (OUTPATIENT)
Dept: GASTROENTEROLOGY | Age: 68
End: 2023-09-19
Payer: MEDICARE

## 2023-09-19 VITALS
BODY MASS INDEX: 30.7 KG/M2 | DIASTOLIC BLOOD PRESSURE: 80 MMHG | HEART RATE: 83 BPM | SYSTOLIC BLOOD PRESSURE: 138 MMHG | OXYGEN SATURATION: 98 % | WEIGHT: 196 LBS

## 2023-09-19 DIAGNOSIS — Z12.11 SPECIAL SCREENING FOR MALIGNANT NEOPLASMS, COLON: ICD-10-CM

## 2023-09-19 DIAGNOSIS — Z12.11 ENCOUNTER FOR SCREENING COLONOSCOPY: Primary | ICD-10-CM

## 2023-09-19 PROCEDURE — 3017F COLORECTAL CA SCREEN DOC REV: CPT | Performed by: INTERNAL MEDICINE

## 2023-09-19 PROCEDURE — 1123F ACP DISCUSS/DSCN MKR DOCD: CPT | Performed by: INTERNAL MEDICINE

## 2023-09-19 PROCEDURE — G8417 CALC BMI ABV UP PARAM F/U: HCPCS | Performed by: INTERNAL MEDICINE

## 2023-09-19 PROCEDURE — G8400 PT W/DXA NO RESULTS DOC: HCPCS | Performed by: INTERNAL MEDICINE

## 2023-09-19 PROCEDURE — 1036F TOBACCO NON-USER: CPT | Performed by: INTERNAL MEDICINE

## 2023-09-19 PROCEDURE — 99214 OFFICE O/P EST MOD 30 MIN: CPT | Performed by: INTERNAL MEDICINE

## 2023-09-19 PROCEDURE — G8427 DOCREV CUR MEDS BY ELIG CLIN: HCPCS | Performed by: INTERNAL MEDICINE

## 2023-09-19 PROCEDURE — 1090F PRES/ABSN URINE INCON ASSESS: CPT | Performed by: INTERNAL MEDICINE

## 2023-09-19 RX ORDER — SODIUM, POTASSIUM,MAG SULFATES 17.5-3.13G
SOLUTION, RECONSTITUTED, ORAL ORAL
Qty: 1 EACH | Refills: 0 | Status: SHIPPED | OUTPATIENT
Start: 2023-09-19

## 2023-09-19 ASSESSMENT — ENCOUNTER SYMPTOMS
NAUSEA: 0
VOMITING: 0
CONSTIPATION: 0
WHEEZING: 0
PHOTOPHOBIA: 0
CHEST TIGHTNESS: 0
TROUBLE SWALLOWING: 0
DIARRHEA: 0
COLOR CHANGE: 0
ABDOMINAL PAIN: 0
SHORTNESS OF BREATH: 0
VOICE CHANGE: 0
ABDOMINAL DISTENTION: 0
EYE PAIN: 0
RECTAL PAIN: 0
EYE REDNESS: 0
BLOOD IN STOOL: 0

## 2023-09-19 NOTE — PROGRESS NOTES
Subjective:      Patient ID: Ba Becker is a 79 y.o. female who presents today for:  Chief Complaint   Patient presents with    Follow-up       HPI  Patient came in today follow-up visit. Denies difficulty swallowing. No abdominal pain. Patient has been on Nexium 20 g daily with control of symptoms. No GERD/heartburn reported she reported few weeks prior to current visit she was having lower abdominal pain that resolved. Patient believes that she may have diverticulitis. Of note patient has been tolerating that was negative. Otherwise patient is currently asymptomatic and denies melena or hematochezia. No diarrhea constipation. Patient came in today for further evaluation and  Prior note  Patient came in today for follow-up visit. No abdominal pain reported. Patient is doing well with no particular complaint. Since last visit patient had CAT scan that was negative. Also had EGD with no acute findings. EGD was normal.  Of note patient was complaining of dysphagia. Biopsies were negative for eosinophilic esophagitis. Otherwise no particular complaint reported at this time. Patient came in today for routine follow-up  Interval note  This is a very pleasant 74-year-old who came in today for the evaluation management of epigastric pain and heartburn/GERD. Patient longstanding history of GERD diagnosed at least 15 years ago. Patient has been to multiple medication and had previous EGD at [facility]. She also reports intermittent dysphagia. Over the last few weeks patient has noticed worsening epigastric pain she did follow with the primary team and had PPI increased to twice daily. Additionally, Carafate was initiated. Patient mentioned that her symptoms could be triggered by stress however she also reported that symptom improved after initiation of PPI and Carafate. 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   Patient reports

## 2023-10-13 ENCOUNTER — HOSPITAL ENCOUNTER (EMERGENCY)
Age: 68
Discharge: HOME | End: 2023-10-13
Payer: MEDICARE

## 2023-10-13 ENCOUNTER — APPOINTMENT (OUTPATIENT)
Dept: GENERAL RADIOLOGY | Age: 68
End: 2023-10-13
Payer: MEDICARE

## 2023-10-13 VITALS
OXYGEN SATURATION: 100 % | SYSTOLIC BLOOD PRESSURE: 179 MMHG | HEIGHT: 67 IN | HEART RATE: 82 BPM | DIASTOLIC BLOOD PRESSURE: 80 MMHG | WEIGHT: 190 LBS | BODY MASS INDEX: 29.82 KG/M2 | RESPIRATION RATE: 16 BRPM | TEMPERATURE: 97.7 F

## 2023-10-13 DIAGNOSIS — S02.2XXA CLOSED FRACTURE OF NASAL BONE, INITIAL ENCOUNTER: Primary | ICD-10-CM

## 2023-10-13 DIAGNOSIS — S83.91XA SPRAIN OF RIGHT KNEE, UNSPECIFIED LIGAMENT, INITIAL ENCOUNTER: ICD-10-CM

## 2023-10-13 PROCEDURE — 99285 EMERGENCY DEPT VISIT HI MDM: CPT

## 2023-10-13 PROCEDURE — 70160 X-RAY EXAM OF NASAL BONES: CPT

## 2023-10-13 PROCEDURE — 73562 X-RAY EXAM OF KNEE 3: CPT

## 2023-10-13 RX ORDER — TRAMADOL HYDROCHLORIDE 50 MG/1
50 TABLET ORAL EVERY 6 HOURS PRN
Qty: 20 TABLET | Refills: 0 | Status: SHIPPED | OUTPATIENT
Start: 2023-10-13 | End: 2023-10-18

## 2023-10-13 ASSESSMENT — ENCOUNTER SYMPTOMS
BACK PAIN: 0
COLOR CHANGE: 0
COUGH: 0
TROUBLE SWALLOWING: 0
NAUSEA: 0
SORE THROAT: 0
ABDOMINAL PAIN: 0
SHORTNESS OF BREATH: 0
VOICE CHANGE: 0
VOMITING: 0
DIARRHEA: 0
CONSTIPATION: 0

## 2023-10-13 ASSESSMENT — PAIN DESCRIPTION - ORIENTATION: ORIENTATION: RIGHT

## 2023-10-13 ASSESSMENT — PAIN - FUNCTIONAL ASSESSMENT: PAIN_FUNCTIONAL_ASSESSMENT: 0-10

## 2023-10-13 ASSESSMENT — PAIN DESCRIPTION - ONSET: ONSET: ON-GOING

## 2023-10-13 ASSESSMENT — PAIN SCALES - GENERAL: PAINLEVEL_OUTOF10: 7

## 2023-10-13 ASSESSMENT — PAIN DESCRIPTION - PAIN TYPE: TYPE: ACUTE PAIN

## 2023-10-13 ASSESSMENT — PAIN DESCRIPTION - LOCATION: LOCATION: HEAD;KNEE

## 2023-10-13 ASSESSMENT — PAIN DESCRIPTION - FREQUENCY: FREQUENCY: CONTINUOUS

## 2023-10-13 NOTE — ED PROVIDER NOTES
[facility] ED  EMERGENCY DEPARTMENT ENCOUNTER      Pt Name: Juan Cordero  MRN: [id number]  [address] [facility] [birth year]  Date of evaluation: 10/13/2023  Provider: NETTE Pittman CNP    CHIEF COMPLAINT       Chief Complaint   Patient presents with    Fall     Facial pain, Rt knee pain          HISTORY OF PRESENT ILLNESS   (Location/Symptom, Timing/Onset, Context/Setting, Quality, Duration, Modifying Factors, Severity)  Note limiting factors. Juan Cordero is a 79 y.o. female per chart review with history of GERD, HTN who presents to the emergency department with complaints of nose pain and right knee pain after falling in the woods while on a walk [place]. She denies LOC. The pain is across the bridge of her nose and the right patella. Denies headache, neck pain, back pain, dizziness, lightheadedness, vision changes. She is not on any anticoagulation. HPI    Nursing Notes were reviewed. REVIEW OF SYSTEMS    (2-9 systems for level 4, 10 or more for level 5)     Review of Systems   Constitutional:  Negative for appetite change and fever. HENT:  Negative for drooling, ear pain, sore throat, trouble swallowing and voice change. Respiratory:  Negative for cough and shortness of breath. Cardiovascular:  Negative for chest pain. Gastrointestinal:  Negative for abdominal pain, constipation, diarrhea, nausea and vomiting. Genitourinary:  Negative for decreased urine volume and dysuria. Musculoskeletal:  Positive for arthralgias. Negative for back pain. Skin:  Negative for color change. Neurological:  Negative for dizziness, weakness, light-headedness and headaches. Psychiatric/Behavioral:  Negative for agitation and behavioral problems. All other systems reviewed and are negative. Except as noted above the remainder of the review of systems was reviewed and negative.        PAST MEDICAL HISTORY     Past Medical History:   Diagnosis Date    GERD (gastroesophageal reflux disease)

## 2023-10-13 NOTE — ED TRIAGE NOTES
Pt states fall while walking in woods, facial injury and rt knee injury. Pt is A&O 4, skin intact, afebrile, breaths are equal and unlabored.

## 2023-10-19 PROBLEM — Z12.11 SPECIAL SCREENING FOR MALIGNANT NEOPLASMS, COLON: Status: RESOLVED | Noted: 2023-09-19 | Resolved: 2023-10-19

## 2023-12-08 ENCOUNTER — PREP FOR PROCEDURE (OUTPATIENT)
Dept: GASTROENTEROLOGY | Age: 68
End: 2023-12-08

## 2023-12-12 ENCOUNTER — ANESTHESIA (OUTPATIENT)
Dept: ENDOSCOPY | Age: 68
End: 2023-12-12
Payer: MEDICARE

## 2023-12-12 ENCOUNTER — HOSPITAL ENCOUNTER (OUTPATIENT)
Age: 68
Setting detail: OUTPATIENT SURGERY
Discharge: HOME | End: 2023-12-12
Attending: INTERNAL MEDICINE | Admitting: INTERNAL MEDICINE
Payer: MEDICARE

## 2023-12-12 ENCOUNTER — ANESTHESIA EVENT (OUTPATIENT)
Dept: ENDOSCOPY | Age: 68
End: 2023-12-12
Payer: MEDICARE

## 2023-12-12 VITALS
DIASTOLIC BLOOD PRESSURE: 62 MMHG | HEIGHT: 67 IN | OXYGEN SATURATION: 99 % | SYSTOLIC BLOOD PRESSURE: 142 MMHG | TEMPERATURE: 98.2 F | BODY MASS INDEX: 29.82 KG/M2 | RESPIRATION RATE: 18 BRPM | WEIGHT: 190 LBS | HEART RATE: 78 BPM

## 2023-12-12 PROBLEM — K64.9 HEMORRHOIDS: Status: ACTIVE | Noted: 2023-12-12

## 2023-12-12 PROCEDURE — 3609027000 HC COLONOSCOPY: Performed by: INTERNAL MEDICINE

## 2023-12-12 PROCEDURE — 2709999900 HC NON-CHARGEABLE SUPPLY: Performed by: INTERNAL MEDICINE

## 2023-12-12 PROCEDURE — 2580000003 HC RX 258

## 2023-12-12 PROCEDURE — 3700000001 HC ADD 15 MINUTES (ANESTHESIA): Performed by: INTERNAL MEDICINE

## 2023-12-12 PROCEDURE — G0121 COLON CA SCRN NOT HI RSK IND: HCPCS | Performed by: INTERNAL MEDICINE

## 2023-12-12 PROCEDURE — 3700000000 HC ANESTHESIA ATTENDED CARE: Performed by: INTERNAL MEDICINE

## 2023-12-12 PROCEDURE — 2580000003 HC RX 258: Performed by: INTERNAL MEDICINE

## 2023-12-12 PROCEDURE — 2500000003 HC RX 250 WO HCPCS: Performed by: NURSE ANESTHETIST, CERTIFIED REGISTERED

## 2023-12-12 PROCEDURE — 7100000011 HC PHASE II RECOVERY - ADDTL 15 MIN: Performed by: INTERNAL MEDICINE

## 2023-12-12 PROCEDURE — 7100000010 HC PHASE II RECOVERY - FIRST 15 MIN: Performed by: INTERNAL MEDICINE

## 2023-12-12 PROCEDURE — 6370000000 HC RX 637 (ALT 250 FOR IP): Performed by: INTERNAL MEDICINE

## 2023-12-12 PROCEDURE — 6360000002 HC RX W HCPCS: Performed by: NURSE ANESTHETIST, CERTIFIED REGISTERED

## 2023-12-12 RX ORDER — SODIUM CHLORIDE 0.9 % (FLUSH) 0.9 %
5-40 SYRINGE (ML) INJECTION EVERY 12 HOURS SCHEDULED
Status: DISCONTINUED | OUTPATIENT
Start: 2023-12-12 | End: 2023-12-12 | Stop reason: HOSPADM

## 2023-12-12 RX ORDER — SODIUM CHLORIDE 9 MG/ML
INJECTION, SOLUTION INTRAVENOUS PRN
Status: DISCONTINUED | OUTPATIENT
Start: 2023-12-12 | End: 2023-12-12 | Stop reason: HOSPADM

## 2023-12-12 RX ORDER — SODIUM CHLORIDE 9 MG/ML
INJECTION, SOLUTION INTRAVENOUS PRN
Status: CANCELLED | OUTPATIENT
Start: 2023-12-12

## 2023-12-12 RX ORDER — SIMETHICONE 20 MG/.3ML
EMULSION ORAL PRN
Status: DISCONTINUED | OUTPATIENT
Start: 2023-12-12 | End: 2023-12-12 | Stop reason: ALTCHOICE

## 2023-12-12 RX ORDER — SODIUM CHLORIDE 0.9 % (FLUSH) 0.9 %
5-40 SYRINGE (ML) INJECTION PRN
Status: CANCELLED | OUTPATIENT
Start: 2023-12-12

## 2023-12-12 RX ORDER — SODIUM CHLORIDE 0.9 % (FLUSH) 0.9 %
5-40 SYRINGE (ML) INJECTION EVERY 12 HOURS SCHEDULED
Status: CANCELLED | OUTPATIENT
Start: 2023-12-12

## 2023-12-12 RX ORDER — MAGNESIUM HYDROXIDE 1200 MG/15ML
LIQUID ORAL PRN
Status: DISCONTINUED | OUTPATIENT
Start: 2023-12-12 | End: 2023-12-12 | Stop reason: ALTCHOICE

## 2023-12-12 RX ORDER — SODIUM CHLORIDE 9 MG/ML
INJECTION, SOLUTION INTRAVENOUS CONTINUOUS
Status: DISCONTINUED | OUTPATIENT
Start: 2023-12-12 | End: 2023-12-12 | Stop reason: HOSPADM

## 2023-12-12 RX ORDER — SODIUM CHLORIDE 9 MG/ML
INJECTION, SOLUTION INTRAVENOUS CONTINUOUS
Status: CANCELLED | OUTPATIENT
Start: 2023-12-12

## 2023-12-12 RX ORDER — LIDOCAINE HYDROCHLORIDE 20 MG/ML
INJECTION, SOLUTION INFILTRATION; PERINEURAL PRN
Status: DISCONTINUED | OUTPATIENT
Start: 2023-12-12 | End: 2023-12-12 | Stop reason: SDUPTHER

## 2023-12-12 RX ORDER — SODIUM CHLORIDE 9 MG/ML
INJECTION, SOLUTION INTRAVENOUS
Status: COMPLETED
Start: 2023-12-12 | End: 2023-12-12

## 2023-12-12 RX ORDER — PROPOFOL 10 MG/ML
INJECTION, EMULSION INTRAVENOUS PRN
Status: DISCONTINUED | OUTPATIENT
Start: 2023-12-12 | End: 2023-12-12 | Stop reason: SDUPTHER

## 2023-12-12 RX ADMIN — LIDOCAINE HYDROCHLORIDE 50 MG: 20 INJECTION, SOLUTION INFILTRATION; PERINEURAL at 07:30

## 2023-12-12 RX ADMIN — PROPOFOL 200 MG: 10 INJECTION, EMULSION INTRAVENOUS at 07:30

## 2023-12-12 RX ADMIN — SODIUM CHLORIDE: 9 INJECTION, SOLUTION INTRAVENOUS at 06:59

## 2023-12-12 ASSESSMENT — PAIN - FUNCTIONAL ASSESSMENT: PAIN_FUNCTIONAL_ASSESSMENT: 0-10

## 2023-12-12 ASSESSMENT — PAIN SCALES - GENERAL: PAINLEVEL_OUTOF10: 0

## 2023-12-12 ASSESSMENT — LIFESTYLE VARIABLES: SMOKING_STATUS: 0

## 2023-12-12 NOTE — ANESTHESIA POSTPROCEDURE EVALUATION
Department of Anesthesiology  Postprocedure Note    Patient: Lalitha Owen  MRN: [id number]  YOB: 1955  Date of evaluation: 12/12/2023      Procedure Summary       Date: 12/12/23 Room / Location: [address] [place] [address] / [address] [place] [address]    Anesthesia Start: 1152 Anesthesia Stop: 0751    Procedure: COLONOSCOPY DIAGNOSTIC Diagnosis:       Special screening for malignant neoplasms, colon      (Special screening for malignant neoplasms, colon [Z12.11])    Surgeons: Diego Lorenz MD Responsible Provider: NETTE Marcos CRNA    Anesthesia Type: MAC ASA Status: 2            Anesthesia Type: No value filed.     Salomon Phase I: Salomon Score: 10    Salomon Phase II:        Anesthesia Post Evaluation    Patient location during evaluation: bedside  Patient participation: complete - patient participated  Level of consciousness: awake and awake and alert  Pain score: 0  Airway patency: patent  Nausea & Vomiting: no nausea and no vomiting  Complications: no  Cardiovascular status: blood pressure returned to baseline and hemodynamically stable  Respiratory status: acceptable  Hydration status: euvolemic  Pain management: adequate

## (undated) DEVICE — ENDO CARRY-ON PROCEDURE KIT: Brand: ENDO CARRY-ON PROCEDURE KIT

## (undated) DEVICE — TUBE SET 96 MM 64 MM H2O PERISTALTIC STD AUX CHANNEL

## (undated) DEVICE — Device: Brand: ENDO SMARTCAP

## (undated) DEVICE — BRUSH ENDO CLN L90.5IN SHTH DIA1.7MM BRIST DIA5-7MM 2-6MM

## (undated) DEVICE — TUBING, SUCTION, 1/4" X 10', STRAIGHT: Brand: MEDLINE

## (undated) DEVICE — PRECISOR HOT DISPOSABLE HOT BIOPSY FORCEPS, 2.8 MM X 230 CM, OLYMPUS CORD: Brand: PRECISOR

## (undated) DEVICE — CONMED SCOPE SAVER BITE BLOCK, 20X27 MM: Brand: SCOPE SAVER

## (undated) DEVICE — SINGLE PORT MANIFOLD: Brand: NEPTUNE 2

## (undated) DEVICE — BRUSH ENDO COMBO